# Patient Record
Sex: MALE | URBAN - METROPOLITAN AREA
[De-identification: names, ages, dates, MRNs, and addresses within clinical notes are randomized per-mention and may not be internally consistent; named-entity substitution may affect disease eponyms.]

---

## 2024-11-01 ENCOUNTER — TELEPHONE (OUTPATIENT)
Age: 49
End: 2024-11-01

## 2024-11-01 NOTE — TELEPHONE ENCOUNTER
Referral for a screening colonoscopy. Please review and advise.      Referral  Referral # [id number]  Referral Information    Referral # Creation Date Referral Status Status Update    [id number] 11/01/2024 Pending Review 11/01/2024: Status History     Status Reason Referral Type Referral Reasons Referral Class   Pending Physician Review Eval and Treat Specialty Services Required Incoming     To Specialty To Provider To Location/Place of Service To Department   Gastroenterology Torsten Garcia MD none [facility] GASTROENTEROLOGY     To Vendor Referred By By Location/Place of Service By Department   none Antonia Gray MD none none     Priority Start Date Expiration Date Referral Entered By   Routine 11/01/2024 11/01/2025 Zohra Graves MA     Visits Requested Visits Authorized Visits Completed Visits Scheduled   2 2       Coverages    Payer Plan Auth. Required? Covered? Member # Authorized From Expires Auth # Precert. # Comment   [facility] -- Covered [id number] 1/1/2024 -- -- -- --     Referral Information    Referral # Creation Date Referral Status Status Update    [id number] 11/01/2024 Pending Review 11/01/2024: Status History     Status Reason Referral Type Referral Reasons Referral Class   Pending Physician Review Eval and Treat Specialty Services Required Incoming     To Specialty To Provider To Location/Place of Service To Department   Gastroenterology Torsten Garcia MD none [facility] GASTROENTEROLOGY     To Vendor Referred By By Location/Place of Service By Department   none Antonia Gray MD none none     Priority Start Date Expiration Date Referral Entered By   Routine 11/01/2024 11/01/2025 Zohra Graves MA     Visits Requested Visits Authorized Visits Completed Visits Scheduled   2 2       Procedure Information    Service Details  Procedure Modifiers Provider Requested Approved   REF25 - AMB REFERRAL TO GASTROENTEROLOGY none

## 2024-11-04 ENCOUNTER — SCHEDULED TELEPHONE ENCOUNTER (OUTPATIENT)
Age: 49
End: 2024-11-04

## 2024-11-04 DIAGNOSIS — Z12.11 ENCOUNTER FOR SCREENING COLONOSCOPY: Primary | ICD-10-CM

## 2024-11-04 DIAGNOSIS — Z12.11 ENCOUNTER FOR SCREENING COLONOSCOPY: ICD-10-CM

## 2024-11-04 RX ORDER — POLYETHYLENE GLYCOL 3350, SODIUM SULFATE ANHYDROUS, SODIUM BICARBONATE, SODIUM CHLORIDE, POTASSIUM CHLORIDE 236; 22.74; 6.74; 5.86; 2.97 G/4L; G/4L; G/4L; G/4L; G/4L
4 POWDER, FOR SOLUTION ORAL ONCE
Qty: 4000 ML | Refills: 0 | Status: SHIPPED | OUTPATIENT
Start: 2024-11-04 | End: 2024-11-04

## 2024-11-04 NOTE — PROGRESS NOTES
Patient scheduled for a screening colonoscopy 12/4/2024  Instructions mailed to the patient  RX sent to nurse  Surgical Registration Form scanned.  Case Request Opened

## 2024-12-04 PROBLEM — Z12.11 ENCOUNTER FOR SCREENING COLONOSCOPY: Status: RESOLVED | Noted: 2024-11-04 | Resolved: 2024-12-04

## 2025-01-23 PROBLEM — Z12.11 ENCOUNTER FOR SCREENING COLONOSCOPY: Status: ACTIVE | Noted: 2024-11-04

## 2025-01-27 ENCOUNTER — ANESTHESIA EVENT (OUTPATIENT)
Age: 50
End: 2025-01-27
Payer: OTHER GOVERNMENT

## 2025-01-27 RX ORDER — SODIUM CHLORIDE 0.9 % (FLUSH) 0.9 %
5-40 SYRINGE (ML) INJECTION EVERY 12 HOURS SCHEDULED
Status: CANCELLED | OUTPATIENT
Start: 2025-01-27

## 2025-01-27 RX ORDER — LISINOPRIL 40 MG/1
1 TABLET ORAL DAILY
COMMUNITY
Start: 2023-12-19

## 2025-01-27 RX ORDER — SODIUM CHLORIDE, SODIUM LACTATE, POTASSIUM CHLORIDE, CALCIUM CHLORIDE 600; 310; 30; 20 MG/100ML; MG/100ML; MG/100ML; MG/100ML
INJECTION, SOLUTION INTRAVENOUS CONTINUOUS
Status: CANCELLED | OUTPATIENT
Start: 2025-01-27

## 2025-01-27 RX ORDER — DULOXETIN HYDROCHLORIDE 60 MG/1
60 CAPSULE, DELAYED RELEASE ORAL DAILY
COMMUNITY

## 2025-01-27 RX ORDER — SODIUM CHLORIDE 9 MG/ML
INJECTION, SOLUTION INTRAVENOUS PRN
Status: CANCELLED | OUTPATIENT
Start: 2025-01-27

## 2025-02-01 ENCOUNTER — PREP FOR PROCEDURE (OUTPATIENT)
Age: 50
End: 2025-02-01

## 2025-02-01 DIAGNOSIS — Z12.11 COLON CANCER SCREENING: Primary | ICD-10-CM

## 2025-02-01 RX ORDER — SODIUM CHLORIDE, SODIUM LACTATE, POTASSIUM CHLORIDE, CALCIUM CHLORIDE 600; 310; 30; 20 MG/100ML; MG/100ML; MG/100ML; MG/100ML
INJECTION, SOLUTION INTRAVENOUS CONTINUOUS
Status: CANCELLED | OUTPATIENT
Start: 2025-02-01

## 2025-02-01 NOTE — H&P
Open access updated H&P.      Brief history: The patient is male Unavailable 49 y.o. who was referred for screening colonoscopy.  Review of the records showed that they had few if any health problems, excessive obesity, or significant medications that would require office evaluation prior to colonoscopy for colon cancer screening.  After review of their chart, contact was made with the patient in discussion and literature sent regarding the procedure and the bowel preparation.  They are here now for their procedure.      Past medical and surgical history:   Past Medical History:   Diagnosis Date    Hypertension       Past Surgical History:   Procedure Laterality Date    BACK SURGERY      FRACTURE SURGERY          Allergies:  No Known Allergies     Medications:  No current facility-administered medications for this encounter.    Current Outpatient Medications:     lisinopril (PRINIVIL;ZESTRIL) 40 MG tablet, Take 1 tablet by mouth daily, Disp: , Rfl:     DULoxetine (CYMBALTA) 60 MG extended release capsule, Take 1 capsule by mouth daily, Disp: , Rfl:      Family history:  The patient has a family history of no known GI disease.    Social history :    Social Connections: Not on file        ROS:   Review of Systems - negative     Vital signs:  Ht 1.676 m (5' 6\")   Wt 63.5 kg (140 lb)   BMI 22.60 kg/m²     PE: Healthy appearing male  HEENT: Normocephalic atraumatic.  No oral abnormalities.  Lungs: Clear to auscultation percussion.  Heart: Regular rate and rhythm without murmur rub or gallop.  Abdomen: Normal bowel sounds, soft nontender without hepatosplenomegaly or masses.  Extremities: No peripheral edema.  Neuro: No distinct abnormalities.    Impression:  1.  Colon cancer screening.  The patient is a healthy male that is stable and here for colon cancer screening via colonoscopy.      Recommendations:  1.  Colonoscopy with MAC.  The risks, benefits, adverse reactions including death and the possibility of missed

## 2025-02-04 ENCOUNTER — ANESTHESIA (OUTPATIENT)
Age: 50
End: 2025-02-04
Payer: OTHER GOVERNMENT

## 2025-02-04 ENCOUNTER — HOSPITAL ENCOUNTER (OUTPATIENT)
Age: 50
Setting detail: OUTPATIENT SURGERY
Discharge: HOME | End: 2025-02-04
Attending: INTERNAL MEDICINE | Admitting: INTERNAL MEDICINE
Payer: OTHER GOVERNMENT

## 2025-02-04 VITALS
SYSTOLIC BLOOD PRESSURE: 125 MMHG | HEART RATE: 70 BPM | HEIGHT: 66 IN | TEMPERATURE: 98 F | BODY MASS INDEX: 22.5 KG/M2 | DIASTOLIC BLOOD PRESSURE: 80 MMHG | WEIGHT: 140 LBS | OXYGEN SATURATION: 100 % | RESPIRATION RATE: 18 BRPM

## 2025-02-04 DIAGNOSIS — Z12.11 COLON CANCER SCREENING: ICD-10-CM

## 2025-02-04 PROCEDURE — 3700000000 HC ANESTHESIA ATTENDED CARE: Performed by: INTERNAL MEDICINE

## 2025-02-04 PROCEDURE — 45380 COLONOSCOPY AND BIOPSY: CPT | Performed by: INTERNAL MEDICINE

## 2025-02-04 PROCEDURE — 6360000002 HC RX W HCPCS: Performed by: NURSE ANESTHETIST, CERTIFIED REGISTERED

## 2025-02-04 PROCEDURE — 7100000011 HC PHASE II RECOVERY - ADDTL 15 MIN: Performed by: INTERNAL MEDICINE

## 2025-02-04 PROCEDURE — 88305 TISSUE EXAM BY PATHOLOGIST: CPT

## 2025-02-04 PROCEDURE — 3700000001 HC ADD 15 MINUTES (ANESTHESIA): Performed by: INTERNAL MEDICINE

## 2025-02-04 PROCEDURE — 3600007502: Performed by: INTERNAL MEDICINE

## 2025-02-04 PROCEDURE — 7100000010 HC PHASE II RECOVERY - FIRST 15 MIN: Performed by: INTERNAL MEDICINE

## 2025-02-04 PROCEDURE — 2709999900 HC NON-CHARGEABLE SUPPLY: Performed by: INTERNAL MEDICINE

## 2025-02-04 PROCEDURE — 2580000003 HC RX 258: Performed by: INTERNAL MEDICINE

## 2025-02-04 PROCEDURE — 3600007512: Performed by: INTERNAL MEDICINE

## 2025-02-04 RX ORDER — PROPOFOL 10 MG/ML
INJECTION, EMULSION INTRAVENOUS
Status: DISCONTINUED | OUTPATIENT
Start: 2025-02-04 | End: 2025-02-04 | Stop reason: SDUPTHER

## 2025-02-04 RX ORDER — SODIUM CHLORIDE, SODIUM LACTATE, POTASSIUM CHLORIDE, CALCIUM CHLORIDE 600; 310; 30; 20 MG/100ML; MG/100ML; MG/100ML; MG/100ML
INJECTION, SOLUTION INTRAVENOUS CONTINUOUS
Status: DISCONTINUED | OUTPATIENT
Start: 2025-02-04 | End: 2025-02-04 | Stop reason: HOSPADM

## 2025-02-04 RX ORDER — ALBUTEROL SULFATE 0.83 MG/ML
2.5 SOLUTION RESPIRATORY (INHALATION) AS NEEDED
Status: DISCONTINUED | OUTPATIENT
Start: 2025-02-04 | End: 2025-02-04 | Stop reason: HOSPADM

## 2025-02-04 RX ORDER — SODIUM CHLORIDE 0.9 % (FLUSH) 0.9 %
5-40 SYRINGE (ML) INJECTION PRN
Status: DISCONTINUED | OUTPATIENT
Start: 2025-02-04 | End: 2025-02-04 | Stop reason: HOSPADM

## 2025-02-04 RX ADMIN — SODIUM CHLORIDE, POTASSIUM CHLORIDE, SODIUM LACTATE AND CALCIUM CHLORIDE: 600; 310; 30; 20 INJECTION, SOLUTION INTRAVENOUS at 12:00

## 2025-02-04 RX ADMIN — PROPOFOL 200 MG: 10 INJECTION, EMULSION INTRAVENOUS at 12:24

## 2025-02-04 RX ADMIN — PROPOFOL 50 MG: 10 INJECTION, EMULSION INTRAVENOUS at 12:30

## 2025-02-04 ASSESSMENT — PAIN - FUNCTIONAL ASSESSMENT
PAIN_FUNCTIONAL_ASSESSMENT: NONE - DENIES PAIN
PAIN_FUNCTIONAL_ASSESSMENT: ADULT NONVERBAL PAIN SCALE (NPVS)

## 2025-02-04 NOTE — ANESTHESIA PRE PROCEDURE
Department of Anesthesiology  Preprocedure Note       Name:  Rudi Ferrer   Age:  49 y.o.  :  1975                                          MRN:  [id number]         Date:  2025      Surgeon: Surgeon(s):  Torsten Garcia MD    Procedure: Procedure(s):  colonoscopy    Medications prior to admission:   Prior to Admission medications    Medication Sig Start Date End Date Taking? Authorizing Provider   lisinopril (PRINIVIL;ZESTRIL) 40 MG tablet Take 1 tablet by mouth daily 23  Yes ProviderBryce MD   DULoxetine (CYMBALTA) 60 MG extended release capsule Take 1 capsule by mouth daily    Provider, MD Bryce       Current medications:    No current facility-administered medications for this encounter.       Allergies:  No Known Allergies    Problem List:    Patient Active Problem List   Diagnosis Code   • Encounter for screening colonoscopy Z12.11       Past Medical History:        Diagnosis Date   • Hypertension        Past Surgical History:        Procedure Laterality Date   • BACK SURGERY     • FRACTURE SURGERY         Social History:    Social History     Tobacco Use   • Smoking status: Former     Current packs/day: 0.50     Average packs/day: 0.5 packs/day for 8.1 years (4.0 ttl pk-yrs)     Types: Cigarettes     Start date:    • Smokeless tobacco: Never   Substance Use Topics   • Alcohol use: Not on file                                Counseling given: Not Answered      Vital Signs (Current):   Vitals:    25 1342   Weight: 63.5 kg (140 lb)   Height: 1.676 m (5' 6\")                                              BP Readings from Last 3 Encounters:   No data found for BP       NPO Status:                                                                                 BMI:   Wt Readings from Last 3 Encounters:   25 63.5 kg (140 lb)     Body mass index is 22.6 kg/m².    CBC: No results found for: \"WBC\", \"RBC\", \"HGB\", \"HCT\", \"MCV\", \"RDW\", \"PLT\"    CMP: No results found for:

## 2025-02-04 NOTE — DISCHARGE INSTRUCTIONS
Impression:  Removal of diminutive sessile rectal polyp.  Diverticulosis.     Recommendations:  Check pathology.  Will notify patient with results when they are available.  Repeat colonoscopy in 5-10 years for for surveillance versus screening based on pathology results.  Follow up as needed.

## 2025-02-04 NOTE — ANESTHESIA POSTPROCEDURE EVALUATION
Department of Anesthesiology  Postprocedure Note    Patient: Rudi Ferrer  MRN: [id number]  YOB: 1975  Date of evaluation: 2/4/2025    Procedure Summary       Date: 02/04/25 Room / Location: [facility] ENDO 01 / [facility] ENDOSCOPY    Anesthesia Start: 1220 Anesthesia Stop: 1235    Procedure: colonoscopy (Lower GI Region) Diagnosis:       Encounter for screening colonoscopy      (Encounter for screening colonoscopy [Z12.11])    Surgeons: Torsten Garcia MD Responsible Provider: Gagandeep Martinez APRN - CRNA    Anesthesia Type: TIVA ASA Status: 2            Anesthesia Type: TIVA    Daja Phase I:      Daja Phase II:      Anesthesia Post Evaluation    Patient location during evaluation: bedside  Patient participation: complete - patient participated  Level of consciousness: awake and awake and alert  Pain score: 0  Airway patency: patent  Nausea & Vomiting: no nausea  Cardiovascular status: blood pressure returned to baseline  Respiratory status: acceptable  Hydration status: euvolemic  Multimodal analgesia pain management approach  Pain management: adequate    No notable events documented.

## 2025-02-04 NOTE — ANESTHESIA PRE PROCEDURE
Department of Anesthesiology  Preprocedure Note       Name:  Rudi Ferrer   Age:  49 y.o.  :  1975                                          MRN:  [id number]         Date:  2025      Surgeon: Surgeon(s):  Torsten Garcia MD    Procedure: Procedure(s):  colonoscopy    Medications prior to admission:   Prior to Admission medications    Medication Sig Start Date End Date Taking? Authorizing Provider   DULoxetine (CYMBALTA) 60 MG extended release capsule Take 1 capsule by mouth daily   Yes ProviderBryce MD   lisinopril (PRINIVIL;ZESTRIL) 40 MG tablet Take 1 tablet by mouth daily 23  Yes ProviderBryce MD       Current medications:    Current Facility-Administered Medications   Medication Dose Route Frequency Provider Last Rate Last Admin   • sodium chloride flush 0.9 % injection 5-40 mL  5-40 mL IntraVENous PRN Gagandeep Martinez APRN - CRNA       • albuterol (PROVENTIL) (2.5 MG/3ML) 0.083% nebulizer solution 2.5 mg  2.5 mg Nebulization PRN Gagandeep Martinez APRN - CRNA       • lactated ringers infusion   IntraVENous Continuous Torsten Garcia  mL/hr at 25 1230 Rate Change at 25 1230       Allergies:  No Known Allergies    Problem List:    Patient Active Problem List   Diagnosis Code   • Encounter for screening colonoscopy Z12.11       Past Medical History:        Diagnosis Date   • Hypertension        Past Surgical History:        Procedure Laterality Date   • BACK SURGERY     • FRACTURE SURGERY         Social History:    Social History     Tobacco Use   • Smoking status: Former     Current packs/day: 0.50     Average packs/day: 0.5 packs/day for 8.1 years (4.0 ttl pk-yrs)     Types: Cigarettes     Start date:    • Smokeless tobacco: Never   Substance Use Topics   • Alcohol use: Not on file                                Counseling given: Not Answered      Vital Signs (Current):   Vitals:    25 1342 25 1155   BP:  132/81   Pulse:  75   Resp:  18

## 2025-02-04 NOTE — ANESTHESIA PRE PROCEDURE
Department of Anesthesiology  Preprocedure Note       Name:  Rudi Ferrer   Age:  49 y.o.  :  1975                                          MRN:  [id number]         Date:  2025      Surgeon: Surgeon(s):  Torsten Garcia MD    Procedure: Procedure(s):  colonoscopy    Medications prior to admission:   Prior to Admission medications    Medication Sig Start Date End Date Taking? Authorizing Provider   DULoxetine (CYMBALTA) 60 MG extended release capsule Take 1 capsule by mouth daily   Yes ProviderBryce MD   lisinopril (PRINIVIL;ZESTRIL) 40 MG tablet Take 1 tablet by mouth daily 23  Yes Provider, MD Bryce       Current medications:    Current Facility-Administered Medications   Medication Dose Route Frequency Provider Last Rate Last Admin    sodium chloride flush 0.9 % injection 5-40 mL  5-40 mL IntraVENous PRN Gagandeep Martinez APRN - CRNA        albuterol (PROVENTIL) (2.5 MG/3ML) 0.083% nebulizer solution 2.5 mg  2.5 mg Nebulization PRN Gaganedep Martinez APRN - CRNA        lactated ringers infusion   IntraVENous Continuous Torsten Garcia  mL/hr at 25 1230 Rate Change at 25 1230       Allergies:  No Known Allergies    Problem List:    Patient Active Problem List   Diagnosis Code    Encounter for screening colonoscopy Z12.11       Past Medical History:        Diagnosis Date    Hypertension        Past Surgical History:        Procedure Laterality Date    BACK SURGERY      FRACTURE SURGERY         Social History:    Social History     Tobacco Use    Smoking status: Former     Current packs/day: 0.50     Average packs/day: 0.5 packs/day for 8.1 years (4.0 ttl pk-yrs)     Types: Cigarettes     Start date:     Smokeless tobacco: Never   Substance Use Topics    Alcohol use: Not on file                                Counseling given: Not Answered      Vital Signs (Current):   Vitals:    25 1342 25 1155   BP:  132/81   Pulse:  75   Resp:  18   Temp:

## 2025-02-04 NOTE — OP NOTE
Colonoscopy procedure note    Date of service: 2/4/2025    Type:  Screening    Indication for procedure: Colon cancer screening    Anesthesia classification: ASA class 2    Patient history and physical been accomplished and documented.  Patient is assessed and determined to be appropriate candidate for planned procedure and sedation; patient reassessed immediately prior to sedation.      Sedation plan: MAC per anesthesia    Surgical assistant: Not applicable    Airway assessment: Range of motion: Normal, mouth opening, Visual obstruction: No.    UPDATED PREOP EXAM:  Unchanged.    VS: Reviewed  Gen: in NAD  CV: RRR, no murmur  Resp: CTA  Abd: Soft, NTND, +BS  Extrem: No cyanosis or edema  Neuro: Awake and alert    Informed consent obtained: Yes.  The indications, risks including but not limited to bleeding, perforation, infection, death, and potential failure to visual areas are diagnosed neoplasia, alternatives and benefits were discussed with the patient prior to the procedure.  Patient identity and procedure was verified, absent was obtained, and is consistent with the consent form found in the patient's records.    PROCEDURE PERFORMED:  COLONOSCOPY  to the cecum with MAC and forcep polypectomy of diminutive rectal polyp.     INSTRUMENT: Olympus colonoscope per nursing notes.    FINDINGS:    External anal lesions: Normal   Rectum: normal except for diminutive polyp removed by forcep polypectomy without incident..  Rectal sphincter tone was normal.   Retroflexion view: Normal  Sigmoid: normal except for diverticulosis  Descending Colon: normal except for diverticulosis.  Transverse Colon: normal except for diverticulosis.  Ascending Colon: normal   Cecum: normal, including the appendiceal orifice and ileocecal valve.    Terminal ileum: not evaluated     Specimens: 1.  Removal of diminutive sessile rectal polyp.    Bowel preparation- adequate to detect small (5mm) polyps or larger.    Estimated blood loss: none

## 2025-02-04 NOTE — INTERVAL H&P NOTE
Update History & Physical    The patient's History and Physical of February 4, 2025 was reviewed with the patient and I examined the patient. There was no change. The surgical site was confirmed by the patient and me.     Plan: The risks, benefits, expected outcome, and alternative to the recommended procedure have been discussed with the patient. Patient understands and wants to proceed with the procedure.     Electronically signed by Torsten Garcia MD on 2/4/2025 at 12:18 PM

## 2025-02-13 ENCOUNTER — TELEPHONE (OUTPATIENT)
Age: 50
End: 2025-02-13

## 2025-02-13 NOTE — TELEPHONE ENCOUNTER
----- Message from Dr. Torsten Garcia MD sent at 2/7/2025  1:31 PM EST -----  Colonoscopy pathology results-the polyp is not an adenoma and is benign, with no malignant potential.  Therefore recommend repeat colonoscopy in 10 years time for colon cancer screening.  Please notify patient of results.

## 2025-02-22 PROBLEM — Z12.11 ENCOUNTER FOR SCREENING COLONOSCOPY: Status: RESOLVED | Noted: 2024-11-04 | Resolved: 2025-02-22

## 2025-02-28 ENCOUNTER — TELEPHONE (OUTPATIENT)
Age: 50
End: 2025-02-28

## (undated) DEVICE — TUBING, SUCTION, 9/32" X 10', STRAIGHT: Brand: MEDLINE

## (undated) DEVICE — FORCEPS BX L240CM JAW DIA2.4MM ORNG L CAP W/ NDL DISP RAD

## (undated) DEVICE — KIT COLON W/ 1.1OZ LUB AND 2 END

## (undated) DEVICE — SOLUTION IRRIG 500ML STRL H2O NONPYROGENIC

## (undated) DEVICE — Device: Brand: DEFENDO VALVE AND CONNECTOR KIT

## (undated) DEVICE — SOLUTION IRRIG 1000ML STRL H2O USP PLAS POUR BTL

## (undated) DEVICE — TUBING INSUFFLATION CAP W/ EXT CARBON DIOX ENDO SMARTCAP

## (undated) DEVICE — TUBING IRRIGATION BK FLO VLV FOR OFP ENDOSTAT ENDOGATOR DISP